# Patient Record
Sex: FEMALE | Race: BLACK OR AFRICAN AMERICAN | NOT HISPANIC OR LATINO | Employment: FULL TIME | URBAN - METROPOLITAN AREA
[De-identification: names, ages, dates, MRNs, and addresses within clinical notes are randomized per-mention and may not be internally consistent; named-entity substitution may affect disease eponyms.]

---

## 2024-01-31 ENCOUNTER — HOSPITAL ENCOUNTER (EMERGENCY)
Facility: HOSPITAL | Age: 29
Discharge: HOME | End: 2024-01-31
Payer: COMMERCIAL

## 2024-01-31 VITALS
OXYGEN SATURATION: 98 % | HEART RATE: 83 BPM | SYSTOLIC BLOOD PRESSURE: 121 MMHG | DIASTOLIC BLOOD PRESSURE: 84 MMHG | RESPIRATION RATE: 16 BRPM | TEMPERATURE: 97.9 F

## 2024-01-31 DIAGNOSIS — Z71.1 CONCERN ABOUT STD IN FEMALE WITHOUT DIAGNOSIS: Primary | ICD-10-CM

## 2024-01-31 LAB
APPEARANCE UR: CLEAR
BILIRUB UR STRIP.AUTO-MCNC: NEGATIVE MG/DL
CLUE CELLS SPEC QL WET PREP: NORMAL
COLOR UR: YELLOW
GLUCOSE UR STRIP.AUTO-MCNC: NEGATIVE MG/DL
KETONES UR STRIP.AUTO-MCNC: ABNORMAL MG/DL
LEUKOCYTE ESTERASE UR QL STRIP.AUTO: NEGATIVE
NITRITE UR QL STRIP.AUTO: NEGATIVE
PH UR STRIP.AUTO: 5 [PH]
PROT UR STRIP.AUTO-MCNC: NEGATIVE MG/DL
RBC # UR STRIP.AUTO: NEGATIVE /UL
SP GR UR STRIP.AUTO: 1.03
T VAGINALIS SPEC QL WET PREP: NORMAL
TRICHOMONAS REFLEX COMMENT: NORMAL
UROBILINOGEN UR STRIP.AUTO-MCNC: 4 MG/DL
WBC VAG QL WET PREP: NORMAL
YEAST VAG QL WET PREP: NORMAL

## 2024-01-31 PROCEDURE — 87661 TRICHOMONAS VAGINALIS AMPLIF: CPT

## 2024-01-31 PROCEDURE — 99284 EMERGENCY DEPT VISIT MOD MDM: CPT

## 2024-01-31 PROCEDURE — 99283 EMERGENCY DEPT VISIT LOW MDM: CPT

## 2024-01-31 PROCEDURE — 81003 URINALYSIS AUTO W/O SCOPE: CPT

## 2024-01-31 PROCEDURE — 87210 SMEAR WET MOUNT SALINE/INK: CPT

## 2024-01-31 PROCEDURE — 87800 DETECT AGNT MULT DNA DIREC: CPT

## 2024-01-31 RX ORDER — ASPIRIN 325 MG
1 TABLET, DELAYED RELEASE (ENTERIC COATED) ORAL NIGHTLY
Qty: 45 G | Refills: 0 | Status: SHIPPED | OUTPATIENT
Start: 2024-01-31 | End: 2024-02-07

## 2024-01-31 RX ORDER — METRONIDAZOLE 500 MG/1
500 TABLET ORAL 2 TIMES DAILY
Qty: 14 TABLET | Refills: 0 | Status: SHIPPED | OUTPATIENT
Start: 2024-01-31 | End: 2024-02-07

## 2024-01-31 RX ORDER — CEPHALEXIN 500 MG/1
500 CAPSULE ORAL 2 TIMES DAILY
Qty: 14 CAPSULE | Refills: 0 | Status: SHIPPED | OUTPATIENT
Start: 2024-01-31 | End: 2024-02-07

## 2024-01-31 ASSESSMENT — COLUMBIA-SUICIDE SEVERITY RATING SCALE - C-SSRS
1. IN THE PAST MONTH, HAVE YOU WISHED YOU WERE DEAD OR WISHED YOU COULD GO TO SLEEP AND NOT WAKE UP?: NO
2. HAVE YOU ACTUALLY HAD ANY THOUGHTS OF KILLING YOURSELF?: NO
6. HAVE YOU EVER DONE ANYTHING, STARTED TO DO ANYTHING, OR PREPARED TO DO ANYTHING TO END YOUR LIFE?: NO

## 2024-01-31 NOTE — ED PROVIDER NOTES
HPI   Chief Complaint   Patient presents with   • Exposure to STD       29-year-old female with history of hypertension presents for STD check.  States that she has some vaginal discharge and irritation to her vagina.  Denies any known STD exposures but wanted to be checked.  She is sexually active with the same person, whom she trusts.  Denies any vaginal bleeding.  Denies abdominal pain.  Denies changes in bowel movements but does endorse some urinary frequency.  Denies back pain or flank pain.  Denies nausea or vomiting.  Denies fever, chills, myalgia.  Denies lesions or rashes.                          No data recorded                Patient History   Past Medical History:   Diagnosis Date   • Acute candidiasis of vulva and vagina 03/11/2015    Candidiasis of vagina   • Anxiety disorder, unspecified 07/26/2018    Anxiety   • Chlamydial infection, unspecified 03/11/2015    Chlamydia   • Encounter for follow-up examination after completed treatment for conditions other than malignant neoplasm 06/11/2019    Follow up   • Encounter for full-term uncomplicated delivery 12/29/2013    Normal delivery   • Encounter for gynecological examination (general) (routine) without abnormal findings 09/09/2019    Well woman exam   • Encounter for other general counseling and advice on procreation 02/21/2017    Encounter for preconception consultation   • Encounter for routine checking of intrauterine contraceptive device 04/19/2016    Intrauterine device surveillance   • Encounter for screening for diabetes mellitus 05/17/2019    Screening for diabetes mellitus   • Encounter for screening for infections with a predominantly sexual mode of transmission 07/26/2018    Screen for sexually transmitted diseases   • Encounter for screening for infections with a predominantly sexual mode of transmission 01/02/2019    Screen for STD (sexually transmitted disease)   • Encounter for screening for malignant neoplasm of cervix 07/26/2018     Cervical cancer screening   • Encounter for supervision of other normal pregnancy, unspecified trimester 03/18/2020    Prenatal care, subsequent pregnancy   • Generalized abdominal pain 06/09/2015    Abdominal pain, diffuse   • Other conditions influencing health status     History of pregnancy   • Other problems related to lifestyle 07/26/2018    Other problems related to lifestyle   • Other problems related to lifestyle 01/28/2015    Other problems related to lifestyle   • Other psychoactive substance abuse, in remission (CMS/HCC)     Personal history of drug abuse   • Other specified disorders of pigmentation 01/28/2015    Tattoo   • Other specified health status     No pertinent past surgical history   • Pelvic and perineal pain 07/26/2018    Female pelvic pain   • Personal history of diseases of the blood and blood-forming organs and certain disorders involving the immune mechanism 05/20/2019    History of anemia   • Personal history of other diseases of the musculoskeletal system and connective tissue 09/09/2019    History of low back pain   • Personal history of other diseases of the respiratory system 07/02/2019    History of allergic rhinitis   • Personal history of other diseases of the respiratory system 11/04/2015    History of asthma   • Personal history of other endocrine, nutritional and metabolic disease 10/07/2019    History of vitamin D deficiency   • Personal history of other infectious and parasitic diseases     History of trichomonal vaginitis   • Personal history of other infectious and parasitic diseases 01/02/2019    History of sexually transmitted disease   • Personal history of other infectious and parasitic diseases 03/02/2015    History of trichomoniasis   • Personal history of other infectious and parasitic diseases 11/05/2015    History of trichomoniasis   • Personal history of other mental and behavioral disorders 04/19/2016    History of depression   • Unspecified abdominal pain      Flank pain, acute     Past Surgical History:   Procedure Laterality Date   • OTHER SURGICAL HISTORY  08/09/2022    No history of surgery     No family history on file.  Social History     Tobacco Use   • Smoking status: Not on file   • Smokeless tobacco: Not on file   Substance Use Topics   • Alcohol use: Not on file   • Drug use: Not on file       Physical Exam   ED Triage Vitals [01/31/24 1610]   Temperature Heart Rate Respirations BP   36.6 °C (97.9 °F) 83 16 121/84      Pulse Ox Temp Source Heart Rate Source Patient Position   98 % Temporal -- --      BP Location FiO2 (%)     -- --       Physical Exam  Constitutional:       Appearance: Normal appearance.   HENT:      Head: Normocephalic and atraumatic.      Mouth/Throat:      Mouth: Mucous membranes are moist.      Pharynx: Oropharynx is clear.   Eyes:      Extraocular Movements: Extraocular movements intact.      Conjunctiva/sclera: Conjunctivae normal.      Pupils: Pupils are equal, round, and reactive to light.   Cardiovascular:      Rate and Rhythm: Normal rate and regular rhythm.      Pulses: Normal pulses.      Heart sounds: Normal heart sounds.   Pulmonary:      Effort: Pulmonary effort is normal.      Breath sounds: Normal breath sounds.   Abdominal:      General: Abdomen is flat.      Palpations: Abdomen is soft.   Musculoskeletal:         General: Normal range of motion.      Cervical back: Normal range of motion and neck supple.   Skin:     General: Skin is warm and dry.      Capillary Refill: Capillary refill takes less than 2 seconds.   Neurological:      General: No focal deficit present.      Mental Status: She is alert and oriented to person, place, and time.   Psychiatric:         Mood and Affect: Mood normal.         Behavior: Behavior normal.         Thought Content: Thought content normal.         Judgment: Judgment normal.         ED Course & MDM   Diagnoses as of 01/31/24 1650   Concern about STD in female without diagnosis       Medical  Decision Making  Vital signs reviewed, unremarkable at this time.  Patient is well-appearing and in no apparent distress.  Speaks full sentences without difficulty.  Diagnostic testing performed.  She declined any blood work at this time.  States that she will be treated empirically with Flagyl for potential BV, as well as antifungal for potential yeast, and Keflex for potential UTI.  States that she cannot wait and has to get her kids home.  States that she thinks she has all of these things and these other medications that she would like to be treated empirically with.  I obliged her.  Given prescriptions to go home with.  Advised her to follow-up with these results on [facility] and follow-up with women's health/primary care soon as possible.  Encouraged her to return with any new or worsening symptoms.  Patient in agreement with this plan.  Discharged in stable condition.        Procedure  Procedures     Ryan Shipman, ANDIE-CNP  01/31/24 1413

## 2024-02-01 ENCOUNTER — TELEPHONE (OUTPATIENT)
Dept: PHARMACY | Facility: HOSPITAL | Age: 29
End: 2024-02-01
Payer: COMMERCIAL

## 2024-02-01 LAB
C TRACH RRNA SPEC QL NAA+PROBE: NEGATIVE
HOLD SPECIMEN: NORMAL
N GONORRHOEA DNA SPEC QL PROBE+SIG AMP: NEGATIVE

## 2024-02-01 NOTE — TELEPHONE ENCOUNTER
EDPD Note: Negative Results    The EDPD Post-Discharge Team was called regarding Justa Jalloh  chlamydia and gonorrhea result that was taken during their recent emergency room visit. I gave patient  their results. The culture/result were negative and there were no other questions at this time.    Trichomonas result still in process. Patient will call back.     If there are any other questions for the ED Post-Discharge Culture Follow Up Team, please contact [phone]. Fax: [phone].    Jazmin Jameson, JaymeD

## 2024-02-02 LAB — T VAGINALIS RRNA SPEC QL NAA+PROBE: NEGATIVE

## 2024-04-02 ENCOUNTER — OFFICE VISIT (OUTPATIENT)
Dept: PRIMARY CARE | Facility: HOSPITAL | Age: 29
End: 2024-04-02
Payer: COMMERCIAL

## 2024-04-02 VITALS
SYSTOLIC BLOOD PRESSURE: 121 MMHG | DIASTOLIC BLOOD PRESSURE: 83 MMHG | HEIGHT: 67 IN | OXYGEN SATURATION: 100 % | TEMPERATURE: 97.8 F | BODY MASS INDEX: 32.96 KG/M2 | HEART RATE: 73 BPM | WEIGHT: 210 LBS

## 2024-04-02 DIAGNOSIS — R20.2 PARESTHESIA: Primary | ICD-10-CM

## 2024-04-02 DIAGNOSIS — Z00.00 HEALTHCARE MAINTENANCE: ICD-10-CM

## 2024-04-02 PROCEDURE — 99215 OFFICE O/P EST HI 40 MIN: CPT

## 2024-04-02 PROCEDURE — 99215 OFFICE O/P EST HI 40 MIN: CPT | Mod: GC

## 2024-04-02 PROCEDURE — 1036F TOBACCO NON-USER: CPT

## 2024-04-02 RX ORDER — GABAPENTIN 100 MG/1
100 CAPSULE ORAL 2 TIMES DAILY
Qty: 60 CAPSULE | Refills: 1 | Status: SHIPPED | OUTPATIENT
Start: 2024-04-02 | End: 2024-09-29

## 2024-04-02 ASSESSMENT — ENCOUNTER SYMPTOMS
OCCASIONAL FEELINGS OF UNSTEADINESS: 0
LOSS OF SENSATION IN FEET: 1
DEPRESSION: 0

## 2024-04-02 ASSESSMENT — PATIENT HEALTH QUESTIONNAIRE - PHQ9
SUM OF ALL RESPONSES TO PHQ9 QUESTIONS 1 AND 2: 2
1. LITTLE INTEREST OR PLEASURE IN DOING THINGS: SEVERAL DAYS
2. FEELING DOWN, DEPRESSED OR HOPELESS: SEVERAL DAYS

## 2024-04-02 ASSESSMENT — PAIN SCALES - GENERAL: PAINLEVEL: 8

## 2024-04-02 NOTE — PATIENT INSTRUCTIONS
Dear Ms. Jalloh,     Here is what we discussed today:   For your paresthesias, we will order the EMG. Please make sure you call Neurology and schedule a follow up appointment.   For your leg numbness/pain we will order gabapentin. Please take 1 pill at night for 3 nights. Then increase to 2 pills per day (1 in morning, 1 at night).   Please follow up with us in 6 weeks.     We wish you the best!  Dr. Desiree Biswas

## 2024-04-02 NOTE — PROGRESS NOTES
I saw and evaluated the patient. I personally obtained the key and critical portions of the history and physical exam or was physically present for key and critical portions performed by the resident/fellow. I reviewed the resident/fellow's documentation and discussed the patient with the resident/fellow. I agree with the resident/fellow's medical decision making as documented in the note.    Prachi Cisse MD

## 2024-04-02 NOTE — LETTER
April 2, 2024     Patient: Justa Jalloh   YOB: 1995   Date of Visit: 4/2/2024       To Whom It May Concern:    Justa Jalloh was seen in my clinic on 4/2/2024 at 8:00 am. Please excuse Justa for her absence from work on this day to make the appointment.    If you have any questions or concerns, please don't hesitate to call.         Sincerely,         Desiree Biswas MD        CC: No Recipients

## 2024-04-02 NOTE — PROGRESS NOTES
HISTORY OF PRESENT ILLNESS:   Justa Jalloh is a 29 y.o. F with Hx significant for htn, anxiety/depression, IBS who presents to establish care.    Pt states that she has tightness in her bilateral calves and feet. She states that it is worse in her feet. She also feels shooting pains in her feet. Has been going on for about a year, occurs every day at this time. Triggers: cold air, sometimes there is no trigger. To alleviate she soaks her feet in warm water and epsom salt, but this only lasts temporarily. She does sometimes feel weakness in her legs (especially walking up stairs) and says that she also feels loss of balance. She feels no symptoms in her arms, but sometimes her R hand will tingle. No falls, no trauma, no LOC, has not hit head.     She states that she has seen Neurology and thought this was her EMG appointment; she states that she needs to get her EMG done.     No cp, intermittent sob (states she has a past hx of asthma), abd pain, n/v, chills, fevers, night sweats    Smoking: former (2-3 cig/day back in high school); now vapes and uses cigars   Alcohol: occasionally    Drugs: no     Living situation: lives 2 girls (13yo, 5yo)  ADLs: independent     ROS: 12 point ROS negative unless as per HPI    Meds:   Amlodipine 5mg daily   B12 500mcg daily   Folate   Anxiety/depression medication (she is unsure of the name)        There are no problems to display for this patient.     No current outpatient medications on file.    No results found for this or any previous visit (from the past 96 hour(s)).       PHYSICAL EXAM:   General: pt is pleasant, cooperative, in NAD  Heart: RRR, no murmur, rub or Lipan  Lungs: clear to auscultation bilaterally with good airflow throughout. No wheezes or rhonchi.  Abdomen: soft, nontender, nondistended, no apparent mass  Extremities: no edema bilaterally  Skin: no rash or lesions; warm and dry   Psychiatric: mental status and behavior appropriate for situation    Neurologic: Normal gait and stance. Normal speech character and tone. No apparent focal deficits.       Musculoskeletal: moving all extremities appropriately.    Assessment and plan:   Justa Jalloh is a 29 y.o. F with Hx significant for htn, anxiety/depression, IBS who presents to establish care.    #paresthesias, bilateral LE  -pt has seen Neurology, Rheumatology, has had extensive w/up  -per Neuro note from 2023 pt needs a f/up EMG  -pt has also seen Rheumatology in 2022   -extensive workup completed as below:   --B12 and folate slightly low, copper slightly high   --normal: tsh, t4, b6, paraneoplastic panel, MMA, esr, crp, NONI/BRIJESH panel, ccp, RF  Plan:   -ordered EMG  -pt to fu with Neurology   -continue B12, folate replacement   -will try gabapentin 100m pill at night for 3 nights. Then increase to 2 pills per day (1 in morning, 1 at night).     #htn   -well controlled at today's visit   -continue amlodipine 5mg daily     #anxiety   -pt will call us back and let us know what medication she is on     #Labs  -will order: cbc, cmp, A1c, lipid panel today     #Health Maintenance  Contraception:   - IUD ()  -follows w OBGYN per pt      GI  -TTG ab neg   -GI bx showed SESSILE SERRATED ADENOMA. Other GI biopsies were wnl     Cancer Screening:  - PAP smear: neg (10/2023)      Cardiovascular Screening:   - Lipid panel:   T Chol 153, HDL 56.5, non-HDL 97     Endocrine:   - Last HbA1c: 4.8 (2022)     Infectious disease:  - HIV: neg (2023)  - Syphilis: neg (2023)  - Hepatitis B:   - Hepatitis C: neg (2023)  - STI screen: neg (2024)     Vaccines:   - Influenza:   - Tdap: 2019  - COVID: 2 vaccines in   - HPV x3      Safety:  - Housing insecurity: n/a  - Food insecurity: n/a  - Firearms in house: no  - Seatbelt use: yes    RTC IN 6 weeks.      Desiree Biswas MD   IM PGY-2

## 2024-05-28 ENCOUNTER — APPOINTMENT (OUTPATIENT)
Dept: PRIMARY CARE | Facility: HOSPITAL | Age: 29
End: 2024-05-28
Payer: COMMERCIAL

## 2024-07-02 ENCOUNTER — CLINICAL SUPPORT (OUTPATIENT)
Dept: EMERGENCY MEDICINE | Facility: HOSPITAL | Age: 29
End: 2024-07-02
Payer: COMMERCIAL

## 2024-07-02 ENCOUNTER — HOSPITAL ENCOUNTER (EMERGENCY)
Facility: HOSPITAL | Age: 29
Discharge: HOME | End: 2024-07-02
Payer: COMMERCIAL

## 2024-07-02 ENCOUNTER — APPOINTMENT (OUTPATIENT)
Dept: RADIOLOGY | Facility: HOSPITAL | Age: 29
End: 2024-07-02
Payer: COMMERCIAL

## 2024-07-02 VITALS
RESPIRATION RATE: 16 BRPM | WEIGHT: 215 LBS | TEMPERATURE: 97.6 F | OXYGEN SATURATION: 97 % | DIASTOLIC BLOOD PRESSURE: 76 MMHG | BODY MASS INDEX: 33.74 KG/M2 | SYSTOLIC BLOOD PRESSURE: 152 MMHG | HEIGHT: 67 IN | HEART RATE: 75 BPM

## 2024-07-02 LAB
ALBUMIN SERPL BCP-MCNC: 4.9 G/DL (ref 3.4–5)
ALP SERPL-CCNC: 90 U/L (ref 33–110)
ALT SERPL W P-5'-P-CCNC: 69 U/L (ref 7–45)
ANION GAP SERPL CALC-SCNC: 15 MMOL/L (ref 10–20)
AST SERPL W P-5'-P-CCNC: 68 U/L (ref 9–39)
BASOPHILS # BLD AUTO: 0.05 X10*3/UL (ref 0–0.1)
BASOPHILS NFR BLD AUTO: 0.7 %
BILIRUB SERPL-MCNC: 0.6 MG/DL (ref 0–1.2)
BUN SERPL-MCNC: 14 MG/DL (ref 6–23)
CALCIUM SERPL-MCNC: 9.8 MG/DL (ref 8.6–10.6)
CHLORIDE SERPL-SCNC: 100 MMOL/L (ref 98–107)
CO2 SERPL-SCNC: 27 MMOL/L (ref 21–32)
CREAT SERPL-MCNC: 0.8 MG/DL (ref 0.5–1.05)
EGFRCR SERPLBLD CKD-EPI 2021: >90 ML/MIN/1.73M*2
EOSINOPHIL # BLD AUTO: 0.01 X10*3/UL (ref 0–0.7)
EOSINOPHIL NFR BLD AUTO: 0.1 %
ERYTHROCYTE [DISTWIDTH] IN BLOOD BY AUTOMATED COUNT: 13.2 % (ref 11.5–14.5)
GLUCOSE SERPL-MCNC: 92 MG/DL (ref 74–99)
HCT VFR BLD AUTO: 37.2 % (ref 36–46)
HGB BLD-MCNC: 12.9 G/DL (ref 12–16)
IMM GRANULOCYTES # BLD AUTO: 0.02 X10*3/UL (ref 0–0.7)
IMM GRANULOCYTES NFR BLD AUTO: 0.3 % (ref 0–0.9)
LYMPHOCYTES # BLD AUTO: 1.66 X10*3/UL (ref 1.2–4.8)
LYMPHOCYTES NFR BLD AUTO: 22.9 %
MAGNESIUM SERPL-MCNC: 1.91 MG/DL (ref 1.6–2.4)
MCH RBC QN AUTO: 34.2 PG (ref 26–34)
MCHC RBC AUTO-ENTMCNC: 34.7 G/DL (ref 32–36)
MCV RBC AUTO: 99 FL (ref 80–100)
MONOCYTES # BLD AUTO: 0.43 X10*3/UL (ref 0.1–1)
MONOCYTES NFR BLD AUTO: 5.9 %
NEUTROPHILS # BLD AUTO: 5.07 X10*3/UL (ref 1.2–7.7)
NEUTROPHILS NFR BLD AUTO: 70.1 %
NRBC BLD-RTO: 0 /100 WBCS (ref 0–0)
PLATELET # BLD AUTO: 334 X10*3/UL (ref 150–450)
POTASSIUM SERPL-SCNC: 4.3 MMOL/L (ref 3.5–5.3)
PREGNANCY TEST URINE, POC: NEGATIVE
PROT SERPL-MCNC: 8.4 G/DL (ref 6.4–8.2)
RBC # BLD AUTO: 3.77 X10*6/UL (ref 4–5.2)
SODIUM SERPL-SCNC: 138 MMOL/L (ref 136–145)
WBC # BLD AUTO: 7.2 X10*3/UL (ref 4.4–11.3)

## 2024-07-02 PROCEDURE — 2500000004 HC RX 250 GENERAL PHARMACY W/ HCPCS (ALT 636 FOR OP/ED): Mod: SE | Performed by: PHYSICIAN ASSISTANT

## 2024-07-02 PROCEDURE — 99285 EMERGENCY DEPT VISIT HI MDM: CPT | Mod: 25

## 2024-07-02 PROCEDURE — 72125 CT NECK SPINE W/O DYE: CPT

## 2024-07-02 PROCEDURE — 96361 HYDRATE IV INFUSION ADD-ON: CPT

## 2024-07-02 PROCEDURE — 70450 CT HEAD/BRAIN W/O DYE: CPT

## 2024-07-02 PROCEDURE — 93005 ELECTROCARDIOGRAM TRACING: CPT

## 2024-07-02 PROCEDURE — 70450 CT HEAD/BRAIN W/O DYE: CPT | Performed by: RADIOLOGY

## 2024-07-02 PROCEDURE — 85025 COMPLETE CBC W/AUTO DIFF WBC: CPT | Performed by: PHYSICIAN ASSISTANT

## 2024-07-02 PROCEDURE — 96375 TX/PRO/DX INJ NEW DRUG ADDON: CPT

## 2024-07-02 PROCEDURE — 99285 EMERGENCY DEPT VISIT HI MDM: CPT | Performed by: PHYSICIAN ASSISTANT

## 2024-07-02 PROCEDURE — 36415 COLL VENOUS BLD VENIPUNCTURE: CPT | Performed by: PHYSICIAN ASSISTANT

## 2024-07-02 PROCEDURE — 2500000004 HC RX 250 GENERAL PHARMACY W/ HCPCS (ALT 636 FOR OP/ED): Mod: SE

## 2024-07-02 PROCEDURE — 81025 URINE PREGNANCY TEST: CPT | Performed by: PHYSICIAN ASSISTANT

## 2024-07-02 PROCEDURE — 96374 THER/PROPH/DIAG INJ IV PUSH: CPT

## 2024-07-02 PROCEDURE — 83735 ASSAY OF MAGNESIUM: CPT | Performed by: PHYSICIAN ASSISTANT

## 2024-07-02 PROCEDURE — 72125 CT NECK SPINE W/O DYE: CPT | Performed by: RADIOLOGY

## 2024-07-02 PROCEDURE — 84075 ASSAY ALKALINE PHOSPHATASE: CPT | Performed by: PHYSICIAN ASSISTANT

## 2024-07-02 RX ORDER — KETOROLAC TROMETHAMINE 15 MG/ML
15 INJECTION, SOLUTION INTRAMUSCULAR; INTRAVENOUS ONCE
Status: COMPLETED | OUTPATIENT
Start: 2024-07-02 | End: 2024-07-02

## 2024-07-02 RX ORDER — METOCLOPRAMIDE HYDROCHLORIDE 5 MG/ML
INJECTION INTRAMUSCULAR; INTRAVENOUS
Status: COMPLETED
Start: 2024-07-02 | End: 2024-07-02

## 2024-07-02 RX ORDER — KETOROLAC TROMETHAMINE 15 MG/ML
INJECTION, SOLUTION INTRAMUSCULAR; INTRAVENOUS
Status: COMPLETED
Start: 2024-07-02 | End: 2024-07-02

## 2024-07-02 RX ORDER — METOCLOPRAMIDE HYDROCHLORIDE 5 MG/ML
10 INJECTION INTRAMUSCULAR; INTRAVENOUS ONCE
Status: COMPLETED | OUTPATIENT
Start: 2024-07-02 | End: 2024-07-02

## 2024-07-02 RX ADMIN — KETOROLAC TROMETHAMINE 15 MG: 15 INJECTION, SOLUTION INTRAMUSCULAR; INTRAVENOUS at 16:26

## 2024-07-02 RX ADMIN — METOCLOPRAMIDE 10 MG: 5 INJECTION, SOLUTION INTRAMUSCULAR; INTRAVENOUS at 16:28

## 2024-07-02 RX ADMIN — SODIUM CHLORIDE, POTASSIUM CHLORIDE, SODIUM LACTATE AND CALCIUM CHLORIDE 1000 ML: 600; 310; 30; 20 INJECTION, SOLUTION INTRAVENOUS at 16:26

## 2024-07-02 RX ADMIN — METOCLOPRAMIDE HYDROCHLORIDE 10 MG: 5 INJECTION INTRAMUSCULAR; INTRAVENOUS at 16:28

## 2024-07-02 ASSESSMENT — LIFESTYLE VARIABLES
TOTAL SCORE: 0
EVER FELT BAD OR GUILTY ABOUT YOUR DRINKING: NO
EVER HAD A DRINK FIRST THING IN THE MORNING TO STEADY YOUR NERVES TO GET RID OF A HANGOVER: NO
HAVE PEOPLE ANNOYED YOU BY CRITICIZING YOUR DRINKING: NO
HAVE YOU EVER FELT YOU SHOULD CUT DOWN ON YOUR DRINKING: NO

## 2024-07-02 ASSESSMENT — COLUMBIA-SUICIDE SEVERITY RATING SCALE - C-SSRS
2. HAVE YOU ACTUALLY HAD ANY THOUGHTS OF KILLING YOURSELF?: NO
1. IN THE PAST MONTH, HAVE YOU WISHED YOU WERE DEAD OR WISHED YOU COULD GO TO SLEEP AND NOT WAKE UP?: NO
6. HAVE YOU EVER DONE ANYTHING, STARTED TO DO ANYTHING, OR PREPARED TO DO ANYTHING TO END YOUR LIFE?: NO

## 2024-07-02 ASSESSMENT — PAIN SCALES - GENERAL: PAINLEVEL_OUTOF10: 6

## 2024-07-02 ASSESSMENT — PAIN - FUNCTIONAL ASSESSMENT: PAIN_FUNCTIONAL_ASSESSMENT: 0-10

## 2024-07-02 NOTE — ED TRIAGE NOTES
Patient is having R hand tingling and lips feel heavy. Patient is having burning pain in the back of neck/head. Symptoms started when she was at work and she was attempting to eat lunch and was unable to due to the pain

## 2024-07-02 NOTE — ED PROVIDER NOTES
HPI   Chief Complaint   Patient presents with    Facial Pain       This is a 29-year-old female with a past medical history of B12/folic acid deficiency who presents to the emergency department with concern for onset of frontal headache, right hand and forearm/lips/facial paresthesias that started earlier this morning.  Onset was spontaneous and is constant in nature.  She does have some associated photophobia with no nausea or vomiting.  She denies extremity motor weakness.  Denies any chest pain, shortness of breath, dizziness, lightheadedness, vision changes.  Has not taken any medications for symptoms.  The patient has been on B12 supplementation but has not been taking folic acid supplements.                          Pacific Coma Scale Score: 15                     Patient History   Past Medical History:   Diagnosis Date    Acute candidiasis of vulva and vagina 03/11/2015    Candidiasis of vagina    Anxiety disorder, unspecified 07/26/2018    Anxiety    Chlamydial infection, unspecified 03/11/2015    Chlamydia    Encounter for follow-up examination after completed treatment for conditions other than malignant neoplasm 06/11/2019    Follow up    Encounter for full-term uncomplicated delivery ([facility]) 12/29/2013    Normal delivery    Encounter for gynecological examination (general) (routine) without abnormal findings 09/09/2019    Well woman exam    Encounter for other general counseling and advice on procreation 02/21/2017    Encounter for preconception consultation    Encounter for routine checking of intrauterine contraceptive device 04/19/2016    Intrauterine device surveillance    Encounter for screening for diabetes mellitus 05/17/2019    Screening for diabetes mellitus    Encounter for screening for infections with a predominantly sexual mode of transmission 07/26/2018    Screen for sexually transmitted diseases    Encounter for screening for infections with a predominantly sexual mode of transmission  01/02/2019    Screen for STD (sexually transmitted disease)    Encounter for screening for malignant neoplasm of cervix 07/26/2018    Cervical cancer screening    Encounter for supervision of other normal pregnancy, unspecified trimester ([facility]) 03/18/2020    Prenatal care, subsequent pregnancy    Generalized abdominal pain 06/09/2015    Abdominal pain, diffuse    Other conditions influencing health status     History of pregnancy    Other problems related to lifestyle 07/26/2018    Other problems related to lifestyle    Other problems related to lifestyle 01/28/2015    Other problems related to lifestyle    Other psychoactive substance abuse, in remission (Multi)     Personal history of drug abuse    Other specified disorders of pigmentation 01/28/2015    Tattoo    Other specified health status     No pertinent past surgical history    Pelvic and perineal pain 07/26/2018    Female pelvic pain    Personal history of diseases of the blood and blood-forming organs and certain disorders involving the immune mechanism 05/20/2019    History of anemia    Personal history of other diseases of the musculoskeletal system and connective tissue 09/09/2019    History of low back pain    Personal history of other diseases of the respiratory system 07/02/2019    History of allergic rhinitis    Personal history of other diseases of the respiratory system 11/04/2015    History of asthma    Personal history of other endocrine, nutritional and metabolic disease 10/07/2019    History of vitamin D deficiency    Personal history of other infectious and parasitic diseases     History of trichomonal vaginitis    Personal history of other infectious and parasitic diseases 01/02/2019    History of sexually transmitted disease    Personal history of other infectious and parasitic diseases 03/02/2015    History of trichomoniasis    Personal history of other infectious and parasitic diseases 11/05/2015    History of trichomoniasis     Personal history of other mental and behavioral disorders 04/19/2016    History of depression    Unspecified abdominal pain     Flank pain, acute     Past Surgical History:   Procedure Laterality Date    OTHER SURGICAL HISTORY  08/09/2022    No history of surgery     No family history on file.  Social History     Tobacco Use    Smoking status: Never    Smokeless tobacco: Never   Substance Use Topics    Alcohol use: Not Currently    Drug use: Never       Physical Exam   ED Triage Vitals   Temperature Heart Rate Respirations BP   07/02/24 1352 07/02/24 1352 07/02/24 1352 07/02/24 1353   36.4 °C (97.6 °F) 75 16 152/76      Pulse Ox Temp src Heart Rate Source Patient Position   07/02/24 1352 -- -- --   97 %         BP Location FiO2 (%)     -- --             Physical Exam  Vitals reviewed.   Constitutional:       Appearance: Normal appearance.   HENT:      Head: Normocephalic and atraumatic.   Cardiovascular:      Rate and Rhythm: Normal rate and regular rhythm.   Pulmonary:      Effort: Pulmonary effort is normal.      Breath sounds: Normal breath sounds.   Musculoskeletal:         General: Normal range of motion.   Neurological:      General: No focal deficit present.      Mental Status: She is alert and oriented to person, place, and time.         ED Course & MDM        Medical Decision Making  29-year-old female, is alert and oriented x 3, afebrile and hemodynamically stable presenting to emergency department with concern for headache and paresthesia.    Patient is in no apparent distress, though is keeping her eyes closed for the majority of the interview.  She is ambulating in pendantly with a stable gait.  She has nonfocal neurologic exam, pupils are equal, round, reactive bilaterally.  EOMs are intact.  Neck is supple, though has some midline cervical spinal tenderness with no palpable step-offs.  No dysmetria noted.  Is full and equal strength in all 4 extremities.    IV access was established, she was ordered  for dose of Toradol and Reglan and a liter of LR.  Blood drawn for CBC, CMP, magnesium and was ordered for CT of the head and cervical spine.  At this time, differential includes vitamin deficiency including known B12/folic acid, potential electrolyte deficiency, cervical radiculopathy.    The patient ended up leaving the emergency department before her blood work/CT scans resulted.  She left without treatment complete.        Procedure  Procedures     Kp Duvall PA-C  07/02/24 3970

## 2024-07-03 LAB
ATRIAL RATE: 75 BPM
P AXIS: 38 DEGREES
P OFFSET: 179 MS
P ONSET: 126 MS
PR INTERVAL: 182 MS
Q ONSET: 217 MS
QRS COUNT: 13 BEATS
QRS DURATION: 80 MS
QT INTERVAL: 410 MS
QTC CALCULATION(BAZETT): 457 MS
QTC FREDERICIA: 441 MS
R AXIS: 1 DEGREES
T AXIS: 1 DEGREES
T OFFSET: 422 MS
VENTRICULAR RATE: 75 BPM

## 2024-08-06 ENCOUNTER — OFFICE VISIT (OUTPATIENT)
Dept: GASTROENTEROLOGY | Facility: HOSPITAL | Age: 29
End: 2024-08-06
Payer: COMMERCIAL

## 2024-08-06 ENCOUNTER — LAB (OUTPATIENT)
Dept: LAB | Facility: LAB | Age: 29
End: 2024-08-06
Payer: COMMERCIAL

## 2024-08-06 VITALS
HEIGHT: 67 IN | DIASTOLIC BLOOD PRESSURE: 73 MMHG | TEMPERATURE: 97.7 F | WEIGHT: 208 LBS | BODY MASS INDEX: 32.65 KG/M2 | SYSTOLIC BLOOD PRESSURE: 110 MMHG

## 2024-08-06 DIAGNOSIS — R10.13 EPIGASTRIC PAIN: ICD-10-CM

## 2024-08-06 DIAGNOSIS — R10.13 EPIGASTRIC PAIN: Primary | ICD-10-CM

## 2024-08-06 DIAGNOSIS — R79.89 ELEVATED LFTS: ICD-10-CM

## 2024-08-06 LAB
AMYLASE SERPL-CCNC: 47 U/L (ref 29–103)
IGG SERPL-MCNC: 1240 MG/DL (ref 700–1600)
IGG1 SER-MCNC: 859 MG/DL (ref 490–1140)
IGG2 SER-MCNC: 317 MG/DL (ref 150–640)
IGG3 SER-MCNC: 34 MG/DL (ref 11–85)
IGG4 SER-MCNC: 22 MG/DL (ref 3–200)
LIPASE SERPL-CCNC: 15 U/L (ref 9–82)

## 2024-08-06 PROCEDURE — 1036F TOBACCO NON-USER: CPT | Performed by: NURSE PRACTITIONER

## 2024-08-06 PROCEDURE — 83690 ASSAY OF LIPASE: CPT

## 2024-08-06 PROCEDURE — 99214 OFFICE O/P EST MOD 30 MIN: CPT | Performed by: NURSE PRACTITIONER

## 2024-08-06 PROCEDURE — 82784 ASSAY IGA/IGD/IGG/IGM EACH: CPT

## 2024-08-06 PROCEDURE — 3008F BODY MASS INDEX DOCD: CPT | Performed by: NURSE PRACTITIONER

## 2024-08-06 PROCEDURE — 82150 ASSAY OF AMYLASE: CPT

## 2024-08-06 PROCEDURE — 36415 COLL VENOUS BLD VENIPUNCTURE: CPT

## 2024-08-06 RX ORDER — ACETAMINOPHEN, DEXTROMETHORPHAN HBR, DOXYLAMINE SUCCINATE, PHENYLEPHRINE HCL 650; 20; 12.5; 1 MG/30ML; MG/30ML; MG/30ML; MG/30ML
1 SOLUTION ORAL 2 TIMES DAILY
COMMUNITY

## 2024-08-06 RX ORDER — AMLODIPINE BESYLATE 5 MG/1
TABLET ORAL DAILY
COMMUNITY

## 2024-08-06 ASSESSMENT — PAIN SCALES - GENERAL: PAINLEVEL: 0-NO PAIN

## 2024-08-06 NOTE — PATIENT INSTRUCTIONS
Abdominal pain- I would recommend stopping smoking and drinking alcohol. I will order an ultrasound of the liver and blood work for your pancreas.     I will contact you with your results and determine follow up

## 2024-08-06 NOTE — PROGRESS NOTES
Subjective   Patient ID: Justa Jalloh is a 29 y.o. female.    HPI  29-year-old female for follow-up visit to review blood work test results  Previously seen 4/11/2023 for alcohol and tobacco induced acute pancreatitis  At that time I ordered an ultrasound of her gallbladder and an IgG subclass 4 which were not done  7/2/2024 AST 68, ALT 69  Total bili 0.6  Alk phos 90  Was seen in the ER  Was taking a lot of aleve for pancreas pain- stopped aleve  Pain after eating  Then pain goes away  Drinks only on holidays or celebration now drinks wine white zinfindal  Vaping- at times  Cut back on tobacco 2 black and milds a week  Appetite good  BM: daily to every 2-3 days  Water- lots    Objective   Physical Exam  Cardiovascular:      Rate and Rhythm: Normal rate and regular rhythm.      Pulses: Normal pulses.      Heart sounds: Normal heart sounds.   Pulmonary:      Effort: Pulmonary effort is normal.      Breath sounds: Normal breath sounds.   Abdominal:      General: Bowel sounds are normal.      Palpations: Abdomen is soft.         Assessment/Plan     Abdominal pain- I would recommend stopping smoking and drinking alcohol. I will order an ultrasound of the liver and blood work for your pancreas.     I will contact you with your results and determine follow up

## 2024-08-12 ENCOUNTER — APPOINTMENT (OUTPATIENT)
Dept: RADIOLOGY | Facility: HOSPITAL | Age: 29
End: 2024-08-12
Payer: COMMERCIAL

## 2024-08-20 ENCOUNTER — APPOINTMENT (OUTPATIENT)
Dept: GASTROENTEROLOGY | Facility: HOSPITAL | Age: 29
End: 2024-08-20
Payer: COMMERCIAL

## 2024-08-27 ENCOUNTER — APPOINTMENT (OUTPATIENT)
Dept: RADIOLOGY | Facility: HOSPITAL | Age: 29
End: 2024-08-27
Payer: COMMERCIAL

## 2024-11-01 ENCOUNTER — OFFICE VISIT (OUTPATIENT)
Dept: OBSTETRICS AND GYNECOLOGY | Facility: CLINIC | Age: 29
End: 2024-11-01
Payer: COMMERCIAL

## 2024-11-01 VITALS
WEIGHT: 216.9 LBS | HEART RATE: 67 BPM | DIASTOLIC BLOOD PRESSURE: 81 MMHG | BODY MASS INDEX: 33.97 KG/M2 | SYSTOLIC BLOOD PRESSURE: 125 MMHG

## 2024-11-01 DIAGNOSIS — R30.0 DYSURIA: Primary | ICD-10-CM

## 2024-11-01 DIAGNOSIS — Z12.4 CERVICAL CANCER SCREENING: ICD-10-CM

## 2024-11-01 LAB
POC APPEARANCE, URINE: CLEAR
POC BILIRUBIN, URINE: NEGATIVE
POC BLOOD, URINE: NEGATIVE
POC COLOR, URINE: YELLOW
POC GLUCOSE, URINE: NEGATIVE MG/DL
POC KETONES, URINE: NEGATIVE MG/DL
POC LEUKOCYTES, URINE: NEGATIVE
POC NITRITE,URINE: NEGATIVE
POC PH, URINE: 7 PH
POC PROTEIN, URINE: NEGATIVE MG/DL
POC SPECIFIC GRAVITY, URINE: 1.02
POC UROBILINOGEN, URINE: 1 EU/DL

## 2024-11-01 PROCEDURE — 99395 PREV VISIT EST AGE 18-39: CPT | Performed by: STUDENT IN AN ORGANIZED HEALTH CARE EDUCATION/TRAINING PROGRAM

## 2024-11-01 PROCEDURE — 1036F TOBACCO NON-USER: CPT | Performed by: STUDENT IN AN ORGANIZED HEALTH CARE EDUCATION/TRAINING PROGRAM

## 2024-11-01 PROCEDURE — 99213 OFFICE O/P EST LOW 20 MIN: CPT | Performed by: STUDENT IN AN ORGANIZED HEALTH CARE EDUCATION/TRAINING PROGRAM

## 2024-11-01 PROCEDURE — 81003 URINALYSIS AUTO W/O SCOPE: CPT | Mod: QW | Performed by: STUDENT IN AN ORGANIZED HEALTH CARE EDUCATION/TRAINING PROGRAM

## 2024-11-01 RX ORDER — LEVONORGESTREL 52 MG/1
1 INTRAUTERINE DEVICE INTRAUTERINE ONCE
COMMUNITY

## 2024-11-01 ASSESSMENT — ENCOUNTER SYMPTOMS
ENDOCRINE NEGATIVE: 0
ALLERGIC/IMMUNOLOGIC NEGATIVE: 0
HEMATOLOGIC/LYMPHATIC NEGATIVE: 0
RESPIRATORY NEGATIVE: 0
NEUROLOGICAL NEGATIVE: 0
CARDIOVASCULAR NEGATIVE: 0
GASTROINTESTINAL NEGATIVE: 0
CONSTITUTIONAL NEGATIVE: 0
EYES NEGATIVE: 0
PSYCHIATRIC NEGATIVE: 0
MUSCULOSKELETAL NEGATIVE: 0

## 2024-11-04 LAB
C TRACH RRNA SPEC QL NAA+PROBE: NEGATIVE
N GONORRHOEA DNA SPEC QL PROBE+SIG AMP: NEGATIVE
T VAGINALIS RRNA SPEC QL NAA+PROBE: NEGATIVE

## 2024-11-15 PROBLEM — R87.610 ASCUS OF CERVIX WITH NEGATIVE HIGH RISK HPV: Status: ACTIVE | Noted: 2024-11-15

## 2024-11-15 LAB
CYTOLOGY CMNT CVX/VAG CYTO-IMP: NORMAL
HPV HR 12 DNA GENITAL QL NAA+PROBE: NEGATIVE
HPV HR GENOTYPES PNL CVX NAA+PROBE: NEGATIVE
HPV16 DNA SPEC QL NAA+PROBE: NEGATIVE
HPV18 DNA SPEC QL NAA+PROBE: NEGATIVE
LAB AP HPV GENOTYPE QUESTION: YES
LAB AP HPV HR: NORMAL
LAB AP PAP ADDITIONAL TESTS: NORMAL
LABORATORY COMMENT REPORT: NORMAL
PATH REPORT.TOTAL CANCER: NORMAL

## 2025-03-19 ENCOUNTER — APPOINTMENT (OUTPATIENT)
Dept: OBSTETRICS AND GYNECOLOGY | Facility: CLINIC | Age: 30
End: 2025-03-19
Payer: COMMERCIAL

## 2025-03-31 ENCOUNTER — APPOINTMENT (OUTPATIENT)
Dept: OBSTETRICS AND GYNECOLOGY | Facility: CLINIC | Age: 30
End: 2025-03-31
Payer: COMMERCIAL

## 2025-04-01 ENCOUNTER — HOSPITAL ENCOUNTER (EMERGENCY)
Facility: HOSPITAL | Age: 30
Discharge: LEFT BEFORE DISCHARGE | End: 2025-04-01
Payer: COMMERCIAL

## 2025-04-01 VITALS
RESPIRATION RATE: 18 BRPM | DIASTOLIC BLOOD PRESSURE: 76 MMHG | HEART RATE: 87 BPM | HEIGHT: 66 IN | WEIGHT: 216 LBS | TEMPERATURE: 97.7 F | SYSTOLIC BLOOD PRESSURE: 131 MMHG | BODY MASS INDEX: 34.72 KG/M2 | OXYGEN SATURATION: 100 %

## 2025-04-01 PROCEDURE — 4500999001 HC ED NO CHARGE: Performed by: STUDENT IN AN ORGANIZED HEALTH CARE EDUCATION/TRAINING PROGRAM

## 2025-04-01 PROCEDURE — 99281 EMR DPT VST MAYX REQ PHY/QHP: CPT

## 2025-04-01 ASSESSMENT — COLUMBIA-SUICIDE SEVERITY RATING SCALE - C-SSRS
2. HAVE YOU ACTUALLY HAD ANY THOUGHTS OF KILLING YOURSELF?: NO
6. HAVE YOU EVER DONE ANYTHING, STARTED TO DO ANYTHING, OR PREPARED TO DO ANYTHING TO END YOUR LIFE?: NO
1. IN THE PAST MONTH, HAVE YOU WISHED YOU WERE DEAD OR WISHED YOU COULD GO TO SLEEP AND NOT WAKE UP?: NO

## 2025-04-08 ENCOUNTER — HOSPITAL ENCOUNTER (EMERGENCY)
Facility: HOSPITAL | Age: 30
Discharge: HOME | End: 2025-04-08
Payer: COMMERCIAL

## 2025-04-08 ENCOUNTER — APPOINTMENT (OUTPATIENT)
Dept: RADIOLOGY | Facility: HOSPITAL | Age: 30
End: 2025-04-08
Payer: COMMERCIAL

## 2025-04-08 VITALS
BODY MASS INDEX: 35.52 KG/M2 | DIASTOLIC BLOOD PRESSURE: 74 MMHG | HEART RATE: 89 BPM | TEMPERATURE: 97.7 F | WEIGHT: 221 LBS | SYSTOLIC BLOOD PRESSURE: 124 MMHG | OXYGEN SATURATION: 100 % | RESPIRATION RATE: 17 BRPM | HEIGHT: 66 IN

## 2025-04-08 DIAGNOSIS — R10.11 RIGHT UPPER QUADRANT ABDOMINAL PAIN: Primary | ICD-10-CM

## 2025-04-08 DIAGNOSIS — R11.2 NAUSEA AND VOMITING, UNSPECIFIED VOMITING TYPE: ICD-10-CM

## 2025-04-08 LAB
ALBUMIN SERPL BCP-MCNC: 4.6 G/DL (ref 3.4–5)
ALP SERPL-CCNC: 60 U/L (ref 33–110)
ALT SERPL W P-5'-P-CCNC: 18 U/L (ref 7–45)
ANION GAP SERPL CALC-SCNC: 18 MMOL/L (ref 10–20)
APPEARANCE UR: ABNORMAL
AST SERPL W P-5'-P-CCNC: 24 U/L (ref 9–39)
BASOPHILS # BLD AUTO: 0.05 X10*3/UL (ref 0–0.1)
BASOPHILS NFR BLD AUTO: 0.8 %
BILIRUB SERPL-MCNC: 0.9 MG/DL (ref 0–1.2)
BILIRUB UR STRIP.AUTO-MCNC: NEGATIVE MG/DL
BUN SERPL-MCNC: 10 MG/DL (ref 6–23)
CALCIUM SERPL-MCNC: 9.2 MG/DL (ref 8.6–10.6)
CHLORIDE SERPL-SCNC: 101 MMOL/L (ref 98–107)
CO2 SERPL-SCNC: 23 MMOL/L (ref 21–32)
COLOR UR: YELLOW
CREAT SERPL-MCNC: 0.7 MG/DL (ref 0.5–1.05)
EGFRCR SERPLBLD CKD-EPI 2021: >90 ML/MIN/1.73M*2
EOSINOPHIL # BLD AUTO: 0.02 X10*3/UL (ref 0–0.7)
EOSINOPHIL NFR BLD AUTO: 0.3 %
ERYTHROCYTE [DISTWIDTH] IN BLOOD BY AUTOMATED COUNT: 12.2 % (ref 11.5–14.5)
FLUAV RNA RESP QL NAA+PROBE: NOT DETECTED
FLUBV RNA RESP QL NAA+PROBE: NOT DETECTED
GLUCOSE SERPL-MCNC: 87 MG/DL (ref 74–99)
GLUCOSE UR STRIP.AUTO-MCNC: NORMAL MG/DL
HCT VFR BLD AUTO: 34.1 % (ref 36–46)
HGB BLD-MCNC: 12.2 G/DL (ref 12–16)
HOLD SPECIMEN: NORMAL
IMM GRANULOCYTES # BLD AUTO: 0.01 X10*3/UL (ref 0–0.7)
IMM GRANULOCYTES NFR BLD AUTO: 0.2 % (ref 0–0.9)
KETONES UR STRIP.AUTO-MCNC: ABNORMAL MG/DL
LEUKOCYTE ESTERASE UR QL STRIP.AUTO: NEGATIVE
LIPASE SERPL-CCNC: 11 U/L (ref 9–82)
LYMPHOCYTES # BLD AUTO: 2.08 X10*3/UL (ref 1.2–4.8)
LYMPHOCYTES NFR BLD AUTO: 31.9 %
MCH RBC QN AUTO: 33.4 PG (ref 26–34)
MCHC RBC AUTO-ENTMCNC: 35.8 G/DL (ref 32–36)
MCV RBC AUTO: 93 FL (ref 80–100)
MONOCYTES # BLD AUTO: 0.56 X10*3/UL (ref 0.1–1)
MONOCYTES NFR BLD AUTO: 8.6 %
MUCOUS THREADS #/AREA URNS AUTO: NORMAL /LPF
NEUTROPHILS # BLD AUTO: 3.8 X10*3/UL (ref 1.2–7.7)
NEUTROPHILS NFR BLD AUTO: 58.2 %
NITRITE UR QL STRIP.AUTO: NEGATIVE
NRBC BLD-RTO: 0 /100 WBCS (ref 0–0)
PH UR STRIP.AUTO: 5.5 [PH]
PLATELET # BLD AUTO: 308 X10*3/UL (ref 150–450)
POTASSIUM SERPL-SCNC: 4 MMOL/L (ref 3.5–5.3)
PREGNANCY TEST URINE, POC: NEGATIVE
PROT SERPL-MCNC: 7.8 G/DL (ref 6.4–8.2)
PROT UR STRIP.AUTO-MCNC: ABNORMAL MG/DL
RBC # BLD AUTO: 3.65 X10*6/UL (ref 4–5.2)
RBC # UR STRIP.AUTO: NEGATIVE MG/DL
RBC #/AREA URNS AUTO: NORMAL /HPF
RSV RNA RESP QL NAA+PROBE: NOT DETECTED
SARS-COV-2 RNA RESP QL NAA+PROBE: NOT DETECTED
SODIUM SERPL-SCNC: 138 MMOL/L (ref 136–145)
SP GR UR STRIP.AUTO: 1.03
SQUAMOUS #/AREA URNS AUTO: NORMAL /HPF
UROBILINOGEN UR STRIP.AUTO-MCNC: NORMAL MG/DL
WBC # BLD AUTO: 6.5 X10*3/UL (ref 4.4–11.3)
WBC #/AREA URNS AUTO: NORMAL /HPF

## 2025-04-08 PROCEDURE — 83690 ASSAY OF LIPASE: CPT

## 2025-04-08 PROCEDURE — 2550000001 HC RX 255 CONTRASTS: Mod: SE

## 2025-04-08 PROCEDURE — 96374 THER/PROPH/DIAG INJ IV PUSH: CPT | Mod: 59

## 2025-04-08 PROCEDURE — 99285 EMERGENCY DEPT VISIT HI MDM: CPT | Mod: 25

## 2025-04-08 PROCEDURE — 81025 URINE PREGNANCY TEST: CPT

## 2025-04-08 PROCEDURE — 87637 SARSCOV2&INF A&B&RSV AMP PRB: CPT

## 2025-04-08 PROCEDURE — 2500000004 HC RX 250 GENERAL PHARMACY W/ HCPCS (ALT 636 FOR OP/ED): Mod: SE

## 2025-04-08 PROCEDURE — 36415 COLL VENOUS BLD VENIPUNCTURE: CPT

## 2025-04-08 PROCEDURE — 74177 CT ABD & PELVIS W/CONTRAST: CPT

## 2025-04-08 PROCEDURE — 74177 CT ABD & PELVIS W/CONTRAST: CPT | Mod: FOREIGN READ | Performed by: STUDENT IN AN ORGANIZED HEALTH CARE EDUCATION/TRAINING PROGRAM

## 2025-04-08 PROCEDURE — 81001 URINALYSIS AUTO W/SCOPE: CPT

## 2025-04-08 PROCEDURE — 80053 COMPREHEN METABOLIC PANEL: CPT

## 2025-04-08 PROCEDURE — 85025 COMPLETE CBC W/AUTO DIFF WBC: CPT

## 2025-04-08 PROCEDURE — 2500000001 HC RX 250 WO HCPCS SELF ADMINISTERED DRUGS (ALT 637 FOR MEDICARE OP): Mod: SE

## 2025-04-08 RX ORDER — ACETAMINOPHEN 325 MG/1
650 TABLET ORAL ONCE
Status: COMPLETED | OUTPATIENT
Start: 2025-04-08 | End: 2025-04-08

## 2025-04-08 RX ORDER — ONDANSETRON 4 MG/1
4 TABLET, ORALLY DISINTEGRATING ORAL EVERY 8 HOURS PRN
Qty: 15 TABLET | Refills: 0 | Status: SHIPPED | OUTPATIENT
Start: 2025-04-08 | End: 2025-04-13

## 2025-04-08 RX ORDER — ONDANSETRON HYDROCHLORIDE 2 MG/ML
4 INJECTION, SOLUTION INTRAVENOUS ONCE
Status: COMPLETED | OUTPATIENT
Start: 2025-04-08 | End: 2025-04-08

## 2025-04-08 RX ADMIN — ONDANSETRON 4 MG: 2 INJECTION INTRAMUSCULAR; INTRAVENOUS at 11:51

## 2025-04-08 RX ADMIN — ACETAMINOPHEN 650 MG: 325 TABLET ORAL at 11:51

## 2025-04-08 RX ADMIN — IOHEXOL 100 ML: 350 INJECTION, SOLUTION INTRAVENOUS at 13:56

## 2025-04-08 ASSESSMENT — PAIN SCALES - GENERAL: PAINLEVEL_OUTOF10: 8

## 2025-04-08 ASSESSMENT — PAIN - FUNCTIONAL ASSESSMENT: PAIN_FUNCTIONAL_ASSESSMENT: 0-10

## 2025-04-08 NOTE — ED TRIAGE NOTES
Pt reports feeling weak and having N/V, weakness and left side pain. Pt also reports a HA with poor appetite and urinary frequency

## 2025-04-08 NOTE — ED PROVIDER NOTES
HPI   Chief Complaint   Patient presents with    Abdominal Pain    Vomiting    Headache       HPI    Justa Jalloh is a 30-year-old female with history of hypertension and iron deficiency presenting to ED with fatigue, abdominal pain, nausea, and vomiting for 1 week.  Patient states every time she eats or drinks she will feel nauseous and occasionally will vomit.  Patient denies blood in the vomit.  Patient states she also has been having increased urinary frequency and dysuria for 1 week.  Patient states she has been having constant abdominal pain to the right abdomen.  Patient states she has not been around anyone that has been sick.  Patient states she has been having left chest wall pain.  Patient states he has been having intermittent aches for the past few weeks.  Patient states she has had some headaches previously.  Patient denies injuries to the head.  Patient states she does not take blood thinners.  Patient denies shortness of breath, fevers, bodyaches, chills.    Patient History   Past Medical History:   Diagnosis Date    Essential hypertension      Past Surgical History:   Procedure Laterality Date    OTHER SURGICAL HISTORY  08/09/2022    No history of surgery     Family History   Problem Relation Name Age of Onset    Breast cancer Neg Hx      Ovarian cancer Neg Hx      Colon cancer Neg Hx       Social History     Tobacco Use    Smoking status: Never    Smokeless tobacco: Never   Vaping Use    Vaping status: Some Days    Substances: Nicotine, Flavoring    Devices: Disposable   Substance Use Topics    Alcohol use: Yes     Alcohol/week: 4.0 standard drinks of alcohol     Types: 4 Standard drinks or equivalent per week    Drug use: Never       Physical Exam   ED Triage Vitals [04/08/25 1022]   Temperature Heart Rate Respirations BP   36.5 °C (97.7 °F) 89 17 124/74      Pulse Ox Temp Source Heart Rate Source Patient Position   100 % Temporal Monitor Sitting      BP Location FiO2 (%)     Left arm --        Physical Exam  Vitals and nursing note reviewed.   Constitutional:       Appearance: Normal appearance.   Cardiovascular:      Rate and Rhythm: Normal rate and regular rhythm.      Heart sounds: Normal heart sounds. No murmur heard.     No gallop.   Pulmonary:      Effort: Pulmonary effort is normal. No respiratory distress.      Breath sounds: Normal breath sounds. No stridor. No wheezing, rhonchi or rales.   Abdominal:      General: Abdomen is flat. Bowel sounds are normal. There is no distension.      Palpations: Abdomen is soft. There is no mass.      Tenderness: There is abdominal tenderness in the right upper quadrant and right lower quadrant. There is no right CVA tenderness, left CVA tenderness, guarding or rebound.      Hernia: No hernia is present.   Musculoskeletal:      Right lower leg: No edema.      Left lower leg: No edema.   Skin:     General: Skin is warm and dry.   Neurological:      General: No focal deficit present.      Mental Status: She is alert and oriented to person, place, and time.   Psychiatric:         Mood and Affect: Mood normal.         Behavior: Behavior normal.           ED Course & MDM   Diagnoses as of 04/08/25 2143   Right upper quadrant abdominal pain   Nausea and vomiting, unspecified vomiting type                 No data recorded     Milagros Coma Scale Score: 15 (04/08/25 1021 : Mariana Johnson RN)                           Medical Decision Making    This is a 30-year-old female presenting the ED with fatigue, abdominal pain, nausea, and vomiting for 1 week.  On exam patient does have tenderness to the right upper quadrant and right lower quadrant of the abdomen.  CT abdomen pelvis was obtained to rule out intra-abdominal abnormality such as cholecystitis, appendicitis, bowel perforation, small bowel obstruction.  Lipase obtained to rule out pancreatitis and lipase within normal limits.  UA obtained to rule out UTI and UA negative for UTI.  Pregnancy test is negative.  CBC  and CMP also obtained today and are unremarkable.  COVID, influenza, RSV swabs are negative. CT shows no CT evidence of acute intra-abdominal pathology in bilateral ovarian follicle/cyst measuring up to 1.5 cm.  Results were discussed with the patient.  On reevaluation patient states her symptoms were improving.  Patient tolerated p.o. challenge without vomiting.  Patient has been hemodynamically stable in the emergency department today.    Disposition: Discharge home  Patient advised to follow-up with a primary care provider.  Prescription for Zofran to use as needed for nausea and vomiting has been sent to the patient's pharmacy.  Patient advised to take the medication as prescribed.  Strict return precautions were given.  Plan was discussed with the patient the patient understands and agrees.  Patient was discharged stable condition.  Procedure  Procedures     Geovani Rooney PA-C  04/08/25 4369

## 2025-04-08 NOTE — DISCHARGE INSTRUCTIONS
Please follow-up with your primary care provider.  Prescription for Zofran to use as needed for nausea and vomiting has been sent to your pharmacy.  Please take medication as prescribed.  Return to the emergency department if your symptoms persist or worsen or any new symptoms began.

## 2025-04-08 NOTE — Clinical Note
Justa Jalloh was seen and treated in our emergency department on 4/8/2025.  She may return to work on 04/17/2025.       If you have any questions or concerns, please don't hesitate to call.      Geovani Rooney PA-C

## 2025-04-17 ENCOUNTER — TELEPHONE (OUTPATIENT)
Dept: OBSTETRICS AND GYNECOLOGY | Facility: CLINIC | Age: 30
End: 2025-04-17
Payer: COMMERCIAL

## 2025-04-23 ENCOUNTER — PROCEDURE VISIT (OUTPATIENT)
Dept: OBSTETRICS AND GYNECOLOGY | Facility: CLINIC | Age: 30
End: 2025-04-23
Payer: COMMERCIAL

## 2025-04-23 VITALS
DIASTOLIC BLOOD PRESSURE: 88 MMHG | BODY MASS INDEX: 35.77 KG/M2 | WEIGHT: 221.6 LBS | HEART RATE: 67 BPM | SYSTOLIC BLOOD PRESSURE: 135 MMHG

## 2025-04-23 DIAGNOSIS — Z11.3 ROUTINE SCREENING FOR STI (SEXUALLY TRANSMITTED INFECTION): ICD-10-CM

## 2025-04-23 DIAGNOSIS — Z30.431 INTRAUTERINE DEVICE SURVEILLANCE: Primary | ICD-10-CM

## 2025-04-23 PROCEDURE — 99213 OFFICE O/P EST LOW 20 MIN: CPT | Performed by: ADVANCED PRACTICE MIDWIFE

## 2025-04-23 NOTE — PROGRESS NOTES
Atilio Marr is a 30 y.o. female who presents for IUD removal and reinsertion.    HPI    Patient with Liletta IUD placed 2020. She was not aware that the new duration of use is 8 years. Requests STI screening    Menstrual History:  OB History    Para Term  AB Living   2 2 2   2   SAB IAB Ectopic Multiple Live Births       2      # Outcome Date GA Lbr Giancarlo/2nd Weight Sex Type Anes PTL Lv   2 Term     F Vag-Spont   ANABELLA   1 Term     F Vag-Spont   ANABELLA     No LMP recorded. (Menstrual status: IUD).     Objective   /88   Pulse 67   Wt 101 kg (221 lb 9.6 oz)   BMI 35.77 kg/m²   Physical Exam  Constitutional:       Appearance: Normal appearance. She is well-developed.   Pulmonary:      Effort: Pulmonary effort is normal.   Neurological:      Mental Status: She is alert.   Psychiatric:         Mood and Affect: Mood and affect normal.         Behavior: Behavior normal. Behavior is cooperative.   Vitals reviewed.     Assessment/Plan   Diagnoses and all orders for this visit:  Intrauterine device surveillance       -      after discussing 8 year duration of use, pt opts to continue with current Liletta IUD       -      due for removal and reinsertion 2028       -      pap UTD       -      condoms for STI prevention as needed       -      RTO prn  Routine screening for STI (sexually transmitted infection)  -     C. trachomatis / N. gonorrhoeae, Amplified, Urogenital  -     Trichomonas vaginalis, Amplified    Phyllis Segovia, APRN-CNM, APRN-CNP

## 2025-04-24 LAB
C TRACH RRNA SPEC QL NAA+PROBE: NOT DETECTED
N GONORRHOEA RRNA SPEC QL NAA+PROBE: NOT DETECTED
QUEST GC CT AMPLIFIED (ALWAYS MESSAGE): NORMAL
T VAGINALIS RRNA SPEC QL NAA+PROBE: NOT DETECTED